# Patient Record
Sex: MALE | Race: WHITE | NOT HISPANIC OR LATINO | Employment: FULL TIME | URBAN - NONMETROPOLITAN AREA
[De-identification: names, ages, dates, MRNs, and addresses within clinical notes are randomized per-mention and may not be internally consistent; named-entity substitution may affect disease eponyms.]

---

## 2019-01-17 ENCOUNTER — HOSPITAL ENCOUNTER (EMERGENCY)
Facility: HOSPITAL | Age: 29
Discharge: HOME | End: 2019-01-17
Attending: EMERGENCY MEDICINE | Admitting: EMERGENCY MEDICINE
Payer: COMMERCIAL

## 2019-01-17 ENCOUNTER — APPOINTMENT (EMERGENCY)
Dept: CT IMAGING | Facility: HOSPITAL | Age: 29
End: 2019-01-17
Payer: COMMERCIAL

## 2019-01-17 VITALS
OXYGEN SATURATION: 94 % | TEMPERATURE: 97.9 F | HEART RATE: 70 BPM | BODY MASS INDEX: 28.31 KG/M2 | DIASTOLIC BLOOD PRESSURE: 79 MMHG | RESPIRATION RATE: 16 BRPM | WEIGHT: 220.46 LBS | SYSTOLIC BLOOD PRESSURE: 124 MMHG

## 2019-01-17 DIAGNOSIS — M51.27 PROLAPSED LUMBOSACRAL INTERVERTEBRAL DISC: Primary | ICD-10-CM

## 2019-01-17 DIAGNOSIS — S33.5XXA LUMBAR BACK SPRAIN: ICD-10-CM

## 2019-01-17 DIAGNOSIS — M62.838 MUSCLE SPASM: ICD-10-CM

## 2019-01-17 LAB
BILIRUB UR QL STRIP: NEGATIVE
CLARITY UR: CLEAR
COLOR UR: YELLOW
GLUCOSE UR STRIP-MCNC: NEGATIVE MG/DL
HGB UR QL STRIP.AUTO: NEGATIVE
KETONES UR STRIP-MCNC: NEGATIVE MG/DL
LEUKOCYTE ESTERASE UR QL STRIP: NEGATIVE
NITRITE UR QL STRIP: NEGATIVE
PH UR STRIP.AUTO: 7 [PH] (ref 4.5–8)
PROT UR STRIP-MCNC: NEGATIVE MG/DL
SP GR UR STRIP.AUTO: 1.02 (ref 1–1.03)
UROBILINOGEN UR QL STRIP.AUTO: 0.2 E.U./DL

## 2019-01-17 PROCEDURE — 96372 THER/PROPH/DIAG INJ SC/IM: CPT

## 2019-01-17 PROCEDURE — 99284 EMERGENCY DEPT VISIT MOD MDM: CPT

## 2019-01-17 PROCEDURE — 72131 CT LUMBAR SPINE W/O DYE: CPT

## 2019-01-17 PROCEDURE — 81003 URINALYSIS AUTO W/O SCOPE: CPT | Performed by: EMERGENCY MEDICINE

## 2019-01-17 RX ORDER — HYDROCODONE BITARTRATE AND ACETAMINOPHEN 5; 325 MG/1; MG/1
1 TABLET ORAL EVERY 6 HOURS PRN
Qty: 20 TABLET | Refills: 0 | Status: SHIPPED | OUTPATIENT
Start: 2019-01-17 | End: 2019-01-22

## 2019-01-17 RX ORDER — CYCLOBENZAPRINE HCL 10 MG
10 TABLET ORAL 3 TIMES DAILY
Qty: 15 TABLET | Refills: 0 | Status: SHIPPED | OUTPATIENT
Start: 2019-01-17 | End: 2019-01-22

## 2019-01-17 RX ORDER — LIDOCAINE 50 MG/G
1 PATCH TOPICAL ONCE
Status: DISCONTINUED | OUTPATIENT
Start: 2019-01-17 | End: 2019-01-17 | Stop reason: HOSPADM

## 2019-01-17 RX ORDER — KETOROLAC TROMETHAMINE 30 MG/ML
30 INJECTION, SOLUTION INTRAMUSCULAR; INTRAVENOUS ONCE
Status: COMPLETED | OUTPATIENT
Start: 2019-01-17 | End: 2019-01-17

## 2019-01-17 RX ORDER — LIDOCAINE 50 MG/G
1 PATCH TOPICAL DAILY
Qty: 5 PATCH | Refills: 0 | Status: SHIPPED | OUTPATIENT
Start: 2019-01-17 | End: 2019-01-22

## 2019-01-17 RX ORDER — MORPHINE SULFATE 4 MG/ML
4 INJECTION, SOLUTION INTRAMUSCULAR; INTRAVENOUS ONCE
Status: COMPLETED | OUTPATIENT
Start: 2019-01-17 | End: 2019-01-17

## 2019-01-17 RX ORDER — HYDROCODONE BITARTRATE AND ACETAMINOPHEN 5; 325 MG/1; MG/1
1 TABLET ORAL ONCE
Status: COMPLETED | OUTPATIENT
Start: 2019-01-17 | End: 2019-01-17

## 2019-01-17 RX ORDER — ACETAMINOPHEN 325 MG/1
975 TABLET ORAL ONCE
Status: COMPLETED | OUTPATIENT
Start: 2019-01-17 | End: 2019-01-17

## 2019-01-17 RX ORDER — CYCLOBENZAPRINE HCL 10 MG
10 TABLET ORAL ONCE
Status: COMPLETED | OUTPATIENT
Start: 2019-01-17 | End: 2019-01-17

## 2019-01-17 RX ADMIN — CYCLOBENZAPRINE HYDROCHLORIDE 10 MG: 10 TABLET, FILM COATED ORAL at 11:47

## 2019-01-17 RX ADMIN — MORPHINE SULFATE 4 MG: 4 INJECTION INTRAVENOUS at 11:47

## 2019-01-17 RX ADMIN — HYDROCODONE BITARTRATE AND ACETAMINOPHEN 1 TABLET: 5; 325 TABLET ORAL at 14:16

## 2019-01-17 RX ADMIN — LIDOCAINE 1 PATCH: 50 PATCH TOPICAL at 11:46

## 2019-01-17 RX ADMIN — ACETAMINOPHEN 975 MG: 325 TABLET, FILM COATED ORAL at 13:53

## 2019-01-17 RX ADMIN — KETOROLAC TROMETHAMINE 30 MG: 30 INJECTION, SOLUTION INTRAMUSCULAR at 11:49

## 2019-01-17 NOTE — DISCHARGE INSTRUCTIONS
Muscle Spasm   WHAT YOU NEED TO KNOW:   A muscle spasm is a sudden contraction of any muscle or group of muscles  A muscle cramp is a painful muscle spasm  Muscle cramps commonly occur after intense exercise or during pregnancy  They may also be caused by certain medications, dehydration, low calcium or magnesium levels, or another medical condition  DISCHARGE INSTRUCTIONS:   Medicines: You may need the following:  · NSAIDs  help decrease swelling and pain or fever  This medicine is available with or without a doctor's order  NSAIDs can cause stomach bleeding or kidney problems in certain people  If you take blood thinner medicine, always ask your healthcare provider if NSAIDs are safe for you  Always read the medicine label and follow directions  · Take your medicine as directed  Contact your healthcare provider if you think your medicine is not helping or if you have side effects  Tell him of her if you are allergic to any medicine  Keep a list of the medicines, vitamins, and herbs you take  Include the amounts, and when and why you take them  Bring the list or the pill bottles to follow-up visits  Carry your medicine list with you in case of an emergency  Follow up with your healthcare provider as directed: You may need other tests or treatment  You may also be referred to a physical therapist or other specialist  Write down your questions so you remember to ask them during your visits  Self-care:   · Stretch  your muscle to help relieve the cramp  It may be helpful to keep your muscle in the stretched position until the cramp is gone  · Apply heat  to help decrease pain and muscle spasms  Apply heat on the area for 20 to 30 minutes every 2 hours for as many days as directed  · Apply ice  to help decrease swelling and pain  Ice may also help prevent tissue damage  Use an ice pack, or put crushed ice in a plastic bag   Cover it with a towel and place it on your muscle for 15 to 20 minutes every hour or as directed  · Drink more liquids  to help prevent muscle cramps caused by dehydration  Sports drinks may help replace electrolytes you lose through sweat during exercise  Ask your healthcare provider how much liquid to drink each day and which liquids are best for you  · Eat healthy foods , such as fruits, vegetables, whole grains, low-fat dairy products, and lean proteins (meat, beans, and fish)  If you are pregnant, ask your healthcare provider about foods that are high in magnesium and sodium  They may help to relieve cramps during pregnancy  · Massage your muscle  to help relieve the cramp  · Take frequent deep breaths  until the cramp feels better  Lie down while you take the deep breaths so you do not get dizzy or lightheaded  Contact your healthcare provider if:   · You have signs of dehydration, such as a headache, dark yellow urine, dry eyes or mouth, or a fast heartbeat  · You have questions or concerns about your condition or care  Return to the emergency department if:   · You have warmth, swelling, or redness in the cramping muscle  · You have frequent or unrelieved muscle cramps in several different muscles  · You have muscle cramps with numbness, tingling, and burning in your hands and feet  © 2017 [address] Information is for End User's use only and may not be sold, redistributed or otherwise used for commercial purposes  All illustrations and images included in CareNotes® are the copyrighted property of [employer]  or [employer]  The above information is an  only  It is not intended as medical advice for individual conditions or treatments  Talk to your doctor, nurse or pharmacist before following any medical regimen to see if it is safe and effective for you  BACK EXERCISES  Back exercises help treat and prevent back injuries   The goal of back exercises is to increase the strength of your abdominal and back muscles and the flexibility of your back  These exercises should be started when you no longer have back pain  Back exercises include:Pelvic Tilt  Lie on your back with your knees bent  Tilt your pelvis until the lower part of your back is against the floor  Hold this position 5 to 10 sec and repeat 5 to 10 times  Knee to Chest  Pull first 1 knee up against your chest and hold for 20 to 30 seconds, repeat this with the other knee, and then both knees  This may be done with the other leg straight or bent, whichever feels better  Sit-Ups or Curl-Ups  Bend your knees 90 degrees  Start with tilting your pelvis, and do a partial, slow sit-up, lifting your trunk only 30 to 45 degrees off the floor  Take at least 2 to 3 seconds for each sit-up  Do not do sit-ups with your knees out straight  If partial sit-ups are difficult, simply do the above but with only tightening your abdominal muscles and holding it as directed  Hip-Lift  Lie on your back with your knees flexed 90 degrees  Push down with your feet and shoulders as you raise your hips a couple inches off the floor; hold for 10 seconds, repeat 5 to 10 times  Back arches  Lie on your stomach, propping yourself up on bent elbows  Slowly press on your hands, causing an arch in your low back  Repeat 3 to 5 times  Any initial stiffness and discomfort should lessen with repetition over time  Shoulder-Lifts  Lie face down with arms beside your body  Keep hips and torso pressed to floor as you slowly lift your head and shoulders off the floor  Do not overdo your exercises, especially in the beginning  Exercises may cause you some mild back discomfort which lasts for a few minutes; however, if the pain is more severe, or lasts for more than 15 minutes, do not continue exercises until you see your caregiver  Improvement with exercise therapy for back problems is slow   See your caregivers for assistance with developing a proper back exercise program       Lumbar Disc Herniation AMBULATORY CARE:   Lumbar disc herniation  occurs when a disc in your lumbar spine (lower back) bulges out  Lumbar discs are spongy cushions between the vertebrae (bones) in your spine  The herniated disc may press on your nerves or spinal cord  Common signs and symptoms include the following:  Mild lumbar disc herniation may not cause any signs or symptoms  You may have any of the following if the herniated disc presses against your nerves or spinal cord:  · Pain in your lower back, buttocks, groin, or legs    · Burning, stabbing, or tingling pain that shoots down one or both of your legs    · Numbness or weakness in one leg    · Trouble walking or moving your feet or toes  Seek care immediately if:   · You cannot control when you urinate or have a bowel movement  · You are unable to move one or both of your legs  · You lose feeling in your groin or buttocks  Contact your healthcare provider if:   · You have numbness in one or both of your legs  · You have low back pain while resting  · You have trouble moving one or both of your legs  · You begin leaking urine or bowel movement, and it is not normal for you  · Your pain gets worse, even after you take medicine  · You have questions or concerns about your condition or care  Treatment:  Your healthcare provider may have you rest in bed for a few days  It is best to rest on your side with your knees bent  Put a cushion between your knees to help decrease the pressure on your spine and nerves  You may also need any of following:  · NSAIDs , such as ibuprofen, help decrease swelling, pain, and fever  NSAIDs can cause stomach bleeding or kidney problems in certain people  If you take blood thinner medicine, always ask your healthcare provider if NSAIDs are safe for you  Always read the medicine label and follow directions  · Prescription pain medicine  may be given  Ask how to take this medicine safely      · Muscle relaxers  decrease pain and muscle spasms  · A steroid injection  may be given to reduce inflammation  Steroid medicine is injected into the epidural space  The epidural space is between your spinal cord and vertebrae  You may be given pain medicine along with the steroids  · Physical therapy  may be recommended by your healthcare provider  A physical therapist teaches you exercises to help improve movement and strength, and to decrease pain  A physical therapist can teach you safe ways to bend, lift, sit, and stand to help relieve back pain  · Surgery  may be needed to fix your herniated disc if other treatments have failed  Surgery may be done to remove your herniated disc and make your spine stronger  Surgery may also be done to decrease pressure on your nerves and spinal cord  Manage pain from a lumbar disc herniation:   · Apply heat  on your lower back for 20 to 30 minutes every 2 hours for as many days as directed  Heat helps decrease pain and muscle spasms  · Do low-stress exercise and activity  Exercises that do not stress your back muscles may help decrease your pain  Examples of low-stress exercises are walking, swimming, and biking  Avoid heavy lifting while your back is healing  Try not to sit for long periods of time  Talk to your healthcare provider before you start any new exercise program   Follow up with your healthcare provider as directed:  Write down your questions so you remember to ask them during your visits  © 2017 [address] Information is for End User's use only and may not be sold, redistributed or otherwise used for commercial purposes  All illustrations and images included in CareNotes® are the copyrighted property of [employer]  or [employer]  The above information is an  only  It is not intended as medical advice for individual conditions or treatments   Talk to your doctor, nurse or pharmacist before following any medical regimen to see if it is safe and effective for you  Core Strengthening Exercises   AMBULATORY CARE:   What you need to know about core strengthening exercises: Your core includes the muscles of your lower back, hip, pelvis, and abdomen  Core strengthening exercises help heal and strengthen these muscles  This helps prevent another injury, and keeps your pelvis, spine, and hips in the correct position  Contact your healthcare provider if:   · You have sharp or worsening pain during exercise or at rest     · You have questions or concerns about your shoulder exercises  Safety tips:  Talk to your healthcare provider before you start an exercise program  A physical therapist can teach you how to do core strengthening exercises safely  · Do the exercises on a mat or firm surface  A firm surface will support your spine and avoid low back pain  Do not do these exercises on a bed  · Move slowly and smoothly  Avoid fast or jerky motions  · Stop if you feel pain  Core exercises should not feel painful  Stop if you feel pain  · Breathe normally during core exercises  Do not hold your breath  This may cause an increase in blood pressure and prevent muscle strengthening  Your healthcare provider will tell you when to inhale and exhale during the exercise  · Begin all of your exercises with abdominal bracing  Abdominal bracing helps warm up your core muscles  You can also practice abdominal bracing throughout the day while you are sitting or standing  Lie on your back with your knees bent and feet flat on the floor  Place your arms in a relaxed position beside your body  Tighten your abdominal muscles  Pull your belly button in and up toward your spine  Hold for 5 seconds  Relax your muscles  Repeat 10 times  Core strengthening exercises: Your healthcare provider will tell you how often to do these exercises  The provider will also tell you how many repetitions of each exercise you should do   Hold each exercise for 5 seconds or as directed  As you get stronger, increase your hold to 10 to 15 seconds  You can do some of these exercises on a stability ball, or with a weight  Ask your healthcare provider how to use a stability ball or weight for these exercises:  · Bent leg side bridge:  Lie on one side with your legs, hips, and shoulders in a straight line  Prop yourself up onto your forearm so your elbow is directly below your shoulder  Bend your knees to 90°  Lift your hips off the floor  Balance yourself on your forearm and the side of your knee  Hold this position  Repeat on the other side  · Straight leg side bridge:  Lie on one side with your legs, hips, and shoulders in a straight line  Prop yourself up onto your forearm so your elbow is directly below your shoulder  Your top leg can rest in front of your bottom leg for support  Lift your hips off the floor  Balance yourself on your forearm and the outside of your flexed foot  Do not let your ankle bend sideways  Hold this position  Repeat on the other side  · Superman:  Lie on your stomach  Extend your arms forward on the floor  Tighten your abdominal muscles and lift your right hand and left leg off the floor  Hold this position  Slowly return to the starting position  Tighten your abdominal muscles and lift your left hand and right leg off the floor  Hold this position  Slowly return to the starting position  · Curl up:  Lie on your back with your knees bent and feet flat on the floor  Place your hands, palms down, underneath your lower back  Next, with your elbows on the floor, lift your shoulders and chest 2 to 3 inches off the floor  Keep your head in line with your shoulders  Hold this position  Slowly return to the starting position  · Straight leg raises:  Lie on your back with one leg straight  Bend the other knee and place your foot flat on the floor  Tighten your abdominal muscles   Keep your leg straight and slowly lift it straight up 6 to 12 inches off the floor  Hold this position  Lower your leg slowly  Do as many repetitions as directed on this side  Repeat with the other leg  · Plank:  Lie on your stomach  Bend your elbows and place your forearms flat on the floor  Lift your chest, stomach, and knees off the floor  Make sure your elbows are below your shoulders  Your body should be in a straight line  Do not let your hips or lower back sink to the ground  Squeeze your abdominal muscles together and hold for 15 seconds  To make this exercise harder, hold for 30 seconds or lift 1 leg at a time  · Bicycles:  Lie on your back  Bend both knees and bring them toward your chest  Your calves should be parallel to the floor  Place the palms of your hands on the back of your head  Straighten your right leg and keep it lifted 2 inches off the floor  Raise your head and shoulders off the floor and twist towards your left  Keep your head and shoulders lifted  Bend your right knee while you straighten your left leg  Keep your left leg 2 inches off the floor  Twist your head and chest towards the left leg  Continue to straighten 1 leg at a time and twist        Follow up with your healthcare provider as directed:  Write down your questions so you remember to ask them during your visits  © 2017 [address] Information is for End User's use only and may not be sold, redistributed or otherwise used for commercial purposes  All illustrations and images included in CareNotes® are the copyrighted property of [employer]  or [employer]  The above information is an  only  It is not intended as medical advice for individual conditions or treatments  Talk to your doctor, nurse or pharmacist before following any medical regimen to see if it is safe and effective for you  Acute Low Back Pain   WHAT YOU NEED TO KNOW:   What is acute low back pain?   Acute low back pain is sudden discomfort in your lower back area that lasts for up to 6 weeks  The discomfort makes it difficult to tolerate activity  What causes or increases my risk for acute low back pain? Conditions that affect the spine, joints, or muscles can cause back pain  These may include arthritis, spinal stenosis (narrowing of the spinal column), muscle tension, or breakdown of the spinal discs  The following increase your risk of back pain:  · Repeated bending, lifting, or twisting, or lifting heavy items    · Injury from a fall or accident    · Lack of regular physical activity     · Obesity, pregnancy     · Smoking    · Aging    · Driving, sitting, or standing for long periods    · Bad posture while sitting or standing  How is acute low back pain diagnosed? Your healthcare provider will ask about your medical history and examine you  He may ask when you last had low back pain and how it started  Show him where you feel the pain and what makes it feel better or worse  Tell him about the type of pain you have, how bad it is, and how long it lasts  Tell him if your pain worsens at night or when you lie down on your back  How is acute low back pain treated? The goal of treatment is to relieve your pain and help you tolerate activity  Most people with acute lower back pain get better within 4 to 6 weeks  You may need any of the following:  · Medicines:      ¨ Acetaminophen  decreases pain  It is available without a doctor's order  Ask how much to take and how often to take it  Follow directions  Acetaminophen can cause liver damage if not taken correctly  ¨ NSAIDs  help decrease swelling and pain  This medicine is available with or without a doctor's order  NSAIDs can cause stomach bleeding or kidney problems in certain people  If you take blood thinner medicine, always ask your healthcare provider if NSAIDs are safe for you  Always read the medicine label and follow directions  ¨ Prescription pain medicine  may be given   Ask your healthcare provider how to take this medicine safely  ¨ Muscle relaxers  decrease pain by relaxing the muscles in your lower spine  What can I do to prevent low back pain? · Use proper body mechanics  ¨ Bend at the hips and knees when you  objects  Do not bend from the waist  Use your leg muscles as you lift the load  Do not use your back  Keep the object close to your chest as you lift it  Try not to twist or lift anything above your waist     ¨ Change your position often when you stand for long periods of time  Rest one foot on a small box or footrest, and then switch to the other foot often  ¨ Try not to sit for long periods of time  When you do, sit in a straight-backed chair with your feet flat on the floor  Never reach, pull, or push while you are sitting  · Do exercises that strengthen your back muscles  Warm up before you exercise  Ask your healthcare provider the best exercises for you  · Maintain a healthy weight  Ask your healthcare provider how much you should weigh  Ask him to help you create a weight loss plan if you are overweight  How can I take care of myself if I have low back pain? · Stay active  as much as you can without causing more pain  Bed rest could make your back pain worse  Start with some light exercises such as walking  Avoid heavy lifting until your pain is gone  Ask for more information about the activities or exercises that are right for you  · Ice  helps decrease swelling, pain, and muscle spams  Put crushed ice in a plastic bag  Cover it with a towel  Place it on your lower back for 20 to 30 minutes every 2 hours  Do this for about 2 to 3 days after your pain starts, or as directed  · Heat  helps decrease pain and muscle spasms  Start to use heat after treatment with ice has stopped  Use a small towel dampened with warm water or a heating pad, or sit in a warm bath   Apply heat on the area for 20 to 30 minutes every 2 hours for as many days as directed  Alternate heat and ice  When should I contact my healthcare provider? · You have a fever  · You have pain at night or when you rest     · Your pain does not get better with treatment  · You have pain that worsens when you cough or sneeze  · You suddenly feel something pop or snap in your back  · You have questions or concerns about your condition or care  When should I seek immediate care or call 911? · You have severe pain  · You have sudden stiffness and heaviness on both buttocks down to both legs  · You have numbness or weakness in one leg, or pain in both legs  · You have numbness in your genital area or across your lower back  · You cannot control your urine or bowel movements  CARE AGREEMENT:   You have the right to help plan your care  Learn about your health condition and how it may be treated  Discuss treatment options with your caregivers to decide what care you want to receive  You always have the right to refuse treatment  The above information is an  only  It is not intended as medical advice for individual conditions or treatments  Talk to your doctor, nurse or pharmacist before following any medical regimen to see if it is safe and effective for you  © 2017 [address] Information is for End User's use only and may not be sold, redistributed or otherwise used for commercial purposes  All illustrations and images included in CareNotes® are the copyrighted property of [employer]  or r[employer]ic [employer]

## 2019-01-17 NOTE — ED PROVIDER NOTES
History  Chief Complaint   Patient presents with    Back Pain     Started with lower back pain yesterday  2 days ago was doing leg curls and since then has been in pain  hx of herniated disks in back     Patient is a 61-year-old male coming in today with back pain started approximately 2 days ago  Patient states he was doing seated hamstring PERRLA machine  He has been having increase in back pain ever since then  He has been taking Aleve with minimal relief  He has no direct injury  He states he has injured his back several times before primarily with power lifting and doing heavy squats  He has had herniated or slipped S but cannot remember which  He has had no surgeries to his L-spine  He has no urinary retention, loss of bowel or bladder, no saddle anesthesia  He has no paresthesias throughout the bilateral lower extremities  He states when he moves his legs it does hurt his back  He has no falls  He has no fevers or chills    He does not have any IV drug abuse        History provided by:  Patient   used: No    Back Pain   Location:  Lumbar spine  Quality:  Aching, cramping and stiffness  Stiffness is present:  Unable to specify  Radiates to:  Does not radiate  Pain severity:  Moderate  Pain is:  Unable to specify  Onset quality:  Gradual  Duration:  2 days  Timing:  Constant  Progression:  Worsening  Chronicity:  Recurrent  Context: physical stress    Context: not emotional stress, not falling, not jumping from heights, not MCA, not MVA, not occupational injury, not pedestrian accident, not recent illness, not recent injury and not twisting    Relieved by:  Nothing  Worsened by:  Ambulation, bending and movement  Ineffective treatments:  NSAIDs  Associated symptoms: weakness    Associated symptoms: no abdominal pain, no abdominal swelling, no bladder incontinence, no bowel incontinence, no chest pain, no dysuria, no fever, no headaches, no leg pain, no numbness, no paresthesias, no pelvic pain, no perianal numbness, no tingling and no weight loss    Risk factors: no hx of cancer, no hx of osteoporosis, no lack of exercise, not obese, no recent surgery, no steroid use and no vascular disease        None       Past Medical History:   Diagnosis Date    Lumbar herniated disc        Past Surgical History:   Procedure Laterality Date    HAND FRACTURE REPAIR Right     HERNIA REPAIR         History reviewed  No pertinent family history  I have reviewed and agree with the history as documented  Social History   Substance Use Topics    Smoking status: Never Smoker    Smokeless tobacco: Never Used    Alcohol use 8 4 oz/week     14 Cans of beer per week        Review of Systems   Constitutional: Negative for diaphoresis, fever and weight loss  HENT: Negative for ear pain and sore throat  Eyes: Negative for visual disturbance  Respiratory: Negative for chest tightness and shortness of breath  Cardiovascular: Negative for chest pain and palpitations  Gastrointestinal: Negative for abdominal pain, bowel incontinence, nausea and vomiting  Genitourinary: Negative for bladder incontinence, difficulty urinating, dysuria and pelvic pain  Musculoskeletal: Positive for back pain  Negative for neck pain  Skin: Negative for rash  Neurological: Positive for weakness  Negative for tingling, numbness, headaches and paresthesias  Psychiatric/Behavioral: Negative for confusion  All other systems reviewed and are negative  Physical Exam  Physical Exam   Constitutional: He is oriented to person, place, and time  He appears well-developed and well-nourished  No distress  HENT:   Head: Normocephalic and atraumatic  Mouth/Throat: Oropharynx is clear and moist    Patient is maintaining airway maintaining secretions  Uvula midline without edema  Eyes: Pupils are equal, round, and reactive to light  Conjunctivae and EOM are normal    Neck: Normal range of motion  Neck supple  Cardiovascular: Normal rate, regular rhythm, normal heart sounds and intact distal pulses  No murmur heard  Pulses:       Radial pulses are 2+ on the right side, and 2+ on the left side  Dorsalis pedis pulses are 2+ on the right side, and 2+ on the left side  Pulmonary/Chest: Effort normal and breath sounds normal  No stridor  No respiratory distress  Abdominal: Soft  Bowel sounds are normal  He exhibits no distension  There is no tenderness  Musculoskeletal: He exhibits no edema  Cervical back: Normal         Thoracic back: Normal         Lumbar back: He exhibits decreased range of motion, tenderness, swelling and spasm  He exhibits no bony tenderness, no edema, no deformity, no laceration, no pain and normal pulse  Back:    Patient has active range of motion of the bilateral lower extremities full  This does cause an increase in low back pain  Manual muscle grade 4 +out of 5 throughout the bilateral lower extremities  Straight leg raise negative at 30 and 45° bilaterally  Neurological: He is alert and oriented to person, place, and time  He has normal strength  He displays no atrophy and no tremor  No cranial nerve deficit or sensory deficit  He exhibits normal muscle tone  He displays no seizure activity  GCS eye subscore is 4  GCS verbal subscore is 5  GCS motor subscore is 6  Reflex Scores:       Patellar reflexes are 2+ on the right side and 2+ on the left side  Achilles reflexes are 2+ on the right side and 2+ on the left side  Skin: Skin is warm  Capillary refill takes less than 2 seconds  He is not diaphoretic  Nursing note and vitals reviewed        Vital Signs  ED Triage Vitals   Temperature Pulse Respirations Blood Pressure SpO2   01/17/19 1048 01/17/19 1048 01/17/19 1048 01/17/19 1048 01/17/19 1048   97 9 °F (36 6 °C) 79 18 166/100 96 %      Temp src Heart Rate Source Patient Position - Orthostatic VS BP Location FiO2 (%)   -- 01/17/19 1048 01/17/19 1200 01/17/19 1048 --    Monitor Lying Right arm       Pain Score       01/17/19 1048       9           Vitals:    01/17/19 1048 01/17/19 1200 01/17/19 1230   BP: 166/100 133/82 124/79   Pulse: 79 78 70   Patient Position - Orthostatic VS:  Lying Lying       Visual Acuity      ED Medications  Medications   lidocaine (LIDODERM) 5 % patch 1 patch (1 patch Topical Medication Applied 1/17/19 1146)   morphine (PF) 4 mg/mL injection 4 mg (4 mg Intramuscular Given 1/17/19 1147)   ketorolac (TORADOL) injection 30 mg (30 mg Intramuscular Given 1/17/19 1149)   cyclobenzaprine (FLEXERIL) tablet 10 mg (10 mg Oral Given 1/17/19 1147)   acetaminophen (TYLENOL) tablet 975 mg (975 mg Oral Given 1/17/19 1353)   HYDROcodone-acetaminophen (NORCO) 5-325 mg per tablet 1 tablet (1 tablet Oral Given 1/17/19 1416)       Diagnostic Studies  Results Reviewed     Procedure Component Value Units Date/Time    UA w Reflex to Microscopic [[id number]] Collected:  01/17/19 1153    Lab Status:  Final result Specimen:  Urine from Urine, Clean Catch Updated:  01/17/19 1202     Color, UA Yellow     Clarity, UA Clear     Specific [place], UA 1 020     pH, UA 7 0     Leukocytes, UA Negative     Nitrite, UA Negative     Protein, UA Negative mg/dl      Glucose, UA Negative mg/dl      Ketones, UA Negative mg/dl      Urobilinogen, UA 0 2 E U /dl      Bilirubin, UA Negative     Blood, UA Negative                 CT spine lumbar without contrast   Final Result by Gabriel Wells MD (01/17 1339)      Disc protrusions L4-5 and L5-S1  No critical stenosis identified           Workstation performed: [id number]                    Procedures  Procedures       Phone Contacts  ED Phone Contact    ED Course                               MDM  Number of Diagnoses or Management Options  Lumbar back sprain:   Muscle spasm:   Prolapsed lumbosacral intervertebral disc:   Diagnosis management comments: Patient is a 70-year-old male coming in today for after back pain after weight lifting  On exam patient does appear in pain  Will give medications for pain control as well as CT rule out herniation  Will also check UA and PVR  On exam he is nontoxic appearing and neurologically intact otherwise    11:56 AM  Patient with >100 cc out and PVR 0 cc  Pending CT and UA     1:01 PM  Patient to CT    1:20 PM  Patient updated on pending C T  Patient states pain is still there but mild to moderately improved compared to initially  Patient has received his medications  He is approximately 2 hr ago    1:58 PM  Patient updated on urine, PVR as well as CT  Discussed with him need for continued movement as well as ambulation  Will follow up with Ortho and will give Dr Zuluaga Bolus number  Will give medications for home as well  Patient states he [place] block away and will walk home  Patient ambulatory in room  He is non ataxic  He does have a walking stick that helps him  He remains neurologically intact with no focal deficits  Patient was given tylenol and Norco - discussed with him to refrain from any more Norco and Tylenol today  He is aware of risk and agreeable  Portions of the record may have been created with voice recognition software  Occasional wrong word or "sound a like" substitutions may have occurred due to the inherent limitations of voice recognition software  Read the chart carefully and recognize, using context, where substitutions have occurred           Amount and/or Complexity of Data Reviewed  Clinical lab tests: reviewed and ordered  Tests in the radiology section of CPT®: ordered and reviewed  Tests in the medicine section of CPT®: reviewed and ordered  Independent visualization of images, tracings, or specimens: yes      CritCare Time    Disposition  Final diagnoses:   Prolapsed lumbosacral intervertebral disc   Lumbar back sprain   Muscle spasm     Time reflects when diagnosis was documented in both MDM as applicable and the Disposition within this note     Time User Action Codes Description Comment    1/17/2019  1:59 PM Prabha Tee Add [M51 27] Prolapsed lumbosacral intervertebral disc     1/17/2019  1:59 PM Kostas Cartwright Add [S33  5XXA] Lumbar back sprain     1/17/2019  1:59 PM Prabha Tee Add [R30 592] Muscle spasm       ED Disposition     ED Disposition Condition Comment    Discharge  Pleas Bald discharge to home/self care  Condition at discharge: Stable        Follow-up Information     Follow up With Specialties Details Why Contact Info    Meghann Alvarenga MD Orthopedic Surgery Schedule an appointment as soon as possible for a visit in 2 days  [address] [address]  [phone]            Patient's Medications   Discharge Prescriptions    CYCLOBENZAPRINE (FLEXERIL) 10 MG TABLET    Take 1 tablet (10 mg total) by mouth 3 (three) times a day for 5 days       Start Date: 1/17/2019 End Date: 1/22/2019       Order Dose: 10 mg       Quantity: 15 tablet    Refills: 0    HYDROCODONE-ACETAMINOPHEN (NORCO) 5-325 MG PER TABLET    Take 1 tablet by mouth every 6 (six) hours as needed for pain for up to 5 days Max Daily Amount: 4 tablets       Start Date: 1/17/2019 End Date: 1/22/2019       Order Dose: 1 tablet       Quantity: 20 tablet    Refills: 0    HYDROCODONE-ACETAMINOPHEN (NORCO) 5-325 MG PER TABLET    Take 1 tablet by mouth every 6 (six) hours as needed for pain for up to 5 days Max Daily Amount: 4 tablets       Start Date: 1/17/2019 End Date: 1/22/2019       Order Dose: 1 tablet       Quantity: 20 tablet    Refills: 0    LIDOCAINE (LIDODERM) 5 %    Apply 1 patch topically daily for 5 days Remove & Discard patch within 12 hours or as directed by MD       Start Date: 1/17/2019 End Date: 1/22/2019       Order Dose: 1 patch       Quantity: 5 patch    Refills: 0     No discharge procedures on file      ED Provider  Electronically Signed by           Nazia Benavidez DO  01/17/19 1428